# Patient Record
Sex: FEMALE | Race: BLACK OR AFRICAN AMERICAN | NOT HISPANIC OR LATINO | URBAN - METROPOLITAN AREA
[De-identification: names, ages, dates, MRNs, and addresses within clinical notes are randomized per-mention and may not be internally consistent; named-entity substitution may affect disease eponyms.]

---

## 2017-07-23 ENCOUNTER — INPATIENT (INPATIENT)
Facility: HOSPITAL | Age: 42
LOS: 2 days | Discharge: HOME | End: 2017-07-26
Attending: INTERNAL MEDICINE | Admitting: INTERNAL MEDICINE
Payer: MEDICAID

## 2017-07-23 VITALS
OXYGEN SATURATION: 100 % | HEART RATE: 106 BPM | DIASTOLIC BLOOD PRESSURE: 95 MMHG | SYSTOLIC BLOOD PRESSURE: 172 MMHG | RESPIRATION RATE: 30 BRPM

## 2017-07-23 DIAGNOSIS — Z29.9 ENCOUNTER FOR PROPHYLACTIC MEASURES, UNSPECIFIED: ICD-10-CM

## 2017-07-23 DIAGNOSIS — I20.0 UNSTABLE ANGINA: ICD-10-CM

## 2017-07-23 DIAGNOSIS — I10 ESSENTIAL (PRIMARY) HYPERTENSION: ICD-10-CM

## 2017-07-23 DIAGNOSIS — J81.0 ACUTE PULMONARY EDEMA: ICD-10-CM

## 2017-07-23 DIAGNOSIS — J45.909 UNSPECIFIED ASTHMA, UNCOMPLICATED: ICD-10-CM

## 2017-07-23 DIAGNOSIS — Z98.891 HISTORY OF UTERINE SCAR FROM PREVIOUS SURGERY: Chronic | ICD-10-CM

## 2017-07-23 LAB
ALBUMIN SERPL ELPH-MCNC: 4.1 G/DL — SIGNIFICANT CHANGE UP (ref 3.3–5)
ALP SERPL-CCNC: 89 U/L — SIGNIFICANT CHANGE UP (ref 40–120)
ALT FLD-CCNC: 17 U/L — SIGNIFICANT CHANGE UP (ref 4–33)
APTT BLD: 26.8 SEC — LOW (ref 27.5–37.4)
AST SERPL-CCNC: 23 U/L — SIGNIFICANT CHANGE UP (ref 4–32)
BASE EXCESS BLDV CALC-SCNC: -1 MMOL/L — SIGNIFICANT CHANGE UP
BASOPHILS # BLD AUTO: 0.02 K/UL — SIGNIFICANT CHANGE UP (ref 0–0.2)
BASOPHILS NFR BLD AUTO: 0.3 % — SIGNIFICANT CHANGE UP (ref 0–2)
BILIRUB SERPL-MCNC: 0.2 MG/DL — SIGNIFICANT CHANGE UP (ref 0.2–1.2)
BLD GP AB SCN SERPL QL: NEGATIVE — SIGNIFICANT CHANGE UP
BLOOD GAS VENOUS - CREATININE: 0.91 MG/DL — SIGNIFICANT CHANGE UP (ref 0.5–1.3)
BUN SERPL-MCNC: 15 MG/DL — SIGNIFICANT CHANGE UP (ref 7–23)
CALCIUM SERPL-MCNC: 8.8 MG/DL — SIGNIFICANT CHANGE UP (ref 8.4–10.5)
CHLORIDE BLDV-SCNC: 113 MMOL/L — HIGH (ref 96–108)
CHLORIDE SERPL-SCNC: 103 MMOL/L — SIGNIFICANT CHANGE UP (ref 98–107)
CK MB BLD-MCNC: 1.86 NG/ML — SIGNIFICANT CHANGE UP (ref 1–4.7)
CK MB BLD-MCNC: 2.06 NG/ML — SIGNIFICANT CHANGE UP (ref 1–4.7)
CK MB BLD-MCNC: SIGNIFICANT CHANGE UP (ref 0–2.5)
CK SERPL-CCNC: 119 U/L — SIGNIFICANT CHANGE UP (ref 25–170)
CK SERPL-CCNC: 129 U/L — SIGNIFICANT CHANGE UP (ref 25–170)
CO2 SERPL-SCNC: 20 MMOL/L — LOW (ref 22–31)
CREAT SERPL-MCNC: 0.96 MG/DL — SIGNIFICANT CHANGE UP (ref 0.5–1.3)
D DIMER BLD IA.RAPID-MCNC: 258 NG/ML — SIGNIFICANT CHANGE UP
EOSINOPHIL # BLD AUTO: 0.11 K/UL — SIGNIFICANT CHANGE UP (ref 0–0.5)
EOSINOPHIL NFR BLD AUTO: 1.7 % — SIGNIFICANT CHANGE UP (ref 0–6)
GAS PNL BLDV: 133 MMOL/L — LOW (ref 136–146)
GLUCOSE BLDV-MCNC: 123 — HIGH (ref 70–99)
GLUCOSE SERPL-MCNC: 119 MG/DL — HIGH (ref 70–99)
HCG SERPL-ACNC: < 5 MIU/ML — SIGNIFICANT CHANGE UP
HCO3 BLDV-SCNC: 23 MMOL/L — SIGNIFICANT CHANGE UP (ref 20–27)
HCT VFR BLD CALC: 41.2 % — SIGNIFICANT CHANGE UP (ref 34.5–45)
HCT VFR BLDV CALC: 40.5 % — SIGNIFICANT CHANGE UP (ref 34.5–45)
HGB BLD-MCNC: 13 G/DL — SIGNIFICANT CHANGE UP (ref 11.5–15.5)
HGB BLDV-MCNC: 13.2 G/DL — SIGNIFICANT CHANGE UP (ref 11.5–15.5)
IMM GRANULOCYTES # BLD AUTO: 0.01 # — SIGNIFICANT CHANGE UP
IMM GRANULOCYTES NFR BLD AUTO: 0.2 % — SIGNIFICANT CHANGE UP (ref 0–1.5)
INR BLD: 0.95 — SIGNIFICANT CHANGE UP (ref 0.88–1.17)
LACTATE BLDV-MCNC: 2.6 MMOL/L — HIGH (ref 0.5–2)
LYMPHOCYTES # BLD AUTO: 2.61 K/UL — SIGNIFICANT CHANGE UP (ref 1–3.3)
LYMPHOCYTES # BLD AUTO: 39.7 % — SIGNIFICANT CHANGE UP (ref 13–44)
MCHC RBC-ENTMCNC: 26.1 PG — LOW (ref 27–34)
MCHC RBC-ENTMCNC: 31.6 % — LOW (ref 32–36)
MCV RBC AUTO: 82.6 FL — SIGNIFICANT CHANGE UP (ref 80–100)
MONOCYTES # BLD AUTO: 0.78 K/UL — SIGNIFICANT CHANGE UP (ref 0–0.9)
MONOCYTES NFR BLD AUTO: 11.9 % — SIGNIFICANT CHANGE UP (ref 2–14)
NEUTROPHILS # BLD AUTO: 3.04 K/UL — SIGNIFICANT CHANGE UP (ref 1.8–7.4)
NEUTROPHILS NFR BLD AUTO: 46.2 % — SIGNIFICANT CHANGE UP (ref 43–77)
NRBC # FLD: 0 — SIGNIFICANT CHANGE UP
NT-PROBNP SERPL-SCNC: 1537 PG/ML — SIGNIFICANT CHANGE UP
PCO2 BLDV: 40 MMHG — LOW (ref 41–51)
PH BLDV: 7.38 PH — SIGNIFICANT CHANGE UP (ref 7.32–7.43)
PLATELET # BLD AUTO: 185 K/UL — SIGNIFICANT CHANGE UP (ref 150–400)
PMV BLD: 13.7 FL — HIGH (ref 7–13)
PO2 BLDV: 65 MMHG — HIGH (ref 35–40)
POTASSIUM BLDV-SCNC: 3.4 MMOL/L — SIGNIFICANT CHANGE UP (ref 3.4–4.5)
POTASSIUM SERPL-MCNC: 3.7 MMOL/L — SIGNIFICANT CHANGE UP (ref 3.5–5.3)
POTASSIUM SERPL-SCNC: 3.7 MMOL/L — SIGNIFICANT CHANGE UP (ref 3.5–5.3)
PROT SERPL-MCNC: 7.8 G/DL — SIGNIFICANT CHANGE UP (ref 6–8.3)
PROTHROM AB SERPL-ACNC: 10.6 SEC — SIGNIFICANT CHANGE UP (ref 9.8–13.1)
RBC # BLD: 4.99 M/UL — SIGNIFICANT CHANGE UP (ref 3.8–5.2)
RBC # FLD: 14 % — SIGNIFICANT CHANGE UP (ref 10.3–14.5)
RH IG SCN BLD-IMP: POSITIVE — SIGNIFICANT CHANGE UP
SAO2 % BLDV: 91.2 % — HIGH (ref 60–85)
SODIUM SERPL-SCNC: 141 MMOL/L — SIGNIFICANT CHANGE UP (ref 135–145)
TROPONIN T SERPL-MCNC: < 0.06 NG/ML — SIGNIFICANT CHANGE UP (ref 0–0.06)
TROPONIN T SERPL-MCNC: < 0.06 NG/ML — SIGNIFICANT CHANGE UP (ref 0–0.06)
WBC # BLD: 6.57 K/UL — SIGNIFICANT CHANGE UP (ref 3.8–10.5)
WBC # FLD AUTO: 6.57 K/UL — SIGNIFICANT CHANGE UP (ref 3.8–10.5)

## 2017-07-23 PROCEDURE — 71275 CT ANGIOGRAPHY CHEST: CPT | Mod: 26

## 2017-07-23 PROCEDURE — 71010: CPT | Mod: 26

## 2017-07-23 RX ORDER — LABETALOL HCL 100 MG
100 TABLET ORAL ONCE
Qty: 0 | Refills: 0 | Status: COMPLETED | OUTPATIENT
Start: 2017-07-23 | End: 2017-07-23

## 2017-07-23 RX ORDER — NITROGLYCERIN 6.5 MG
200 CAPSULE, EXTENDED RELEASE ORAL
Qty: 50 | Refills: 0 | Status: DISCONTINUED | OUTPATIENT
Start: 2017-07-23 | End: 2017-07-23

## 2017-07-23 RX ORDER — NITROGLYCERIN 6.5 MG
100 CAPSULE, EXTENDED RELEASE ORAL
Qty: 50 | Refills: 0 | Status: DISCONTINUED | OUTPATIENT
Start: 2017-07-23 | End: 2017-07-23

## 2017-07-23 RX ORDER — ASPIRIN/CALCIUM CARB/MAGNESIUM 324 MG
81 TABLET ORAL DAILY
Qty: 0 | Refills: 0 | Status: DISCONTINUED | OUTPATIENT
Start: 2017-07-23 | End: 2017-07-26

## 2017-07-23 RX ORDER — LISINOPRIL 2.5 MG/1
10 TABLET ORAL DAILY
Qty: 0 | Refills: 0 | Status: DISCONTINUED | OUTPATIENT
Start: 2017-07-23 | End: 2017-07-26

## 2017-07-23 RX ORDER — FUROSEMIDE 40 MG
20 TABLET ORAL ONCE
Qty: 0 | Refills: 0 | Status: COMPLETED | OUTPATIENT
Start: 2017-07-23 | End: 2017-07-23

## 2017-07-23 RX ORDER — ALBUTEROL 90 UG/1
2 AEROSOL, METERED ORAL EVERY 6 HOURS
Qty: 0 | Refills: 0 | Status: DISCONTINUED | OUTPATIENT
Start: 2017-07-23 | End: 2017-07-26

## 2017-07-23 RX ORDER — FUROSEMIDE 40 MG
20 TABLET ORAL DAILY
Qty: 0 | Refills: 0 | Status: DISCONTINUED | OUTPATIENT
Start: 2017-07-24 | End: 2017-07-26

## 2017-07-23 RX ORDER — IPRATROPIUM/ALBUTEROL SULFATE 18-103MCG
3 AEROSOL WITH ADAPTER (GRAM) INHALATION ONCE
Qty: 0 | Refills: 0 | Status: COMPLETED | OUTPATIENT
Start: 2017-07-23 | End: 2017-07-23

## 2017-07-23 RX ORDER — NITROGLYCERIN 6.5 MG
50 CAPSULE, EXTENDED RELEASE ORAL
Qty: 50 | Refills: 0 | Status: DISCONTINUED | OUTPATIENT
Start: 2017-07-23 | End: 2017-07-23

## 2017-07-23 RX ADMIN — Medication 30 MICROGRAM(S)/MIN: at 06:55

## 2017-07-23 RX ADMIN — Medication 100 MILLIGRAM(S): at 07:59

## 2017-07-23 RX ADMIN — Medication 81 MILLIGRAM(S): at 14:11

## 2017-07-23 RX ADMIN — Medication 30 MICROGRAM(S)/MIN: at 09:22

## 2017-07-23 RX ADMIN — Medication 20 MILLIGRAM(S): at 09:38

## 2017-07-23 RX ADMIN — LISINOPRIL 10 MILLIGRAM(S): 2.5 TABLET ORAL at 09:35

## 2017-07-23 RX ADMIN — Medication 3 MILLILITER(S): at 06:50

## 2017-07-23 RX ADMIN — Medication 15 MICROGRAM(S)/MIN: at 09:33

## 2017-07-23 NOTE — H&P ADULT - HISTORY OF PRESENT ILLNESS
40 yo female PMHx of Asthma and HTN not on any meds p/w chest tightness and SOB at rest since 4 am. Pt reports she was up all night with talking and laughing with her family, when she suddenly became dyspneic at rest and developed severe chest tightness. Chest tightness was midsternal, 10/10, non-pleuritic, non-radiating, at rest. Pt reports she felt like she was wheezing and inhaled few puffs of Albuterol inhaler with minimal relief. She called 911 who gave her Nitro SL tabs x3 doses and her symptoms resolved. Pt denies fever, chills, diaphoresis, palpitations, nausea, vomiting or abdominal. Pt never had TTE or ischemic work up before.

## 2017-07-23 NOTE — H&P ADULT - NSHPLABSRESULTS_GEN_ALL_CORE
EKG: Sinus Tachycardia @ 113 bpm with TWI III and AVF, LVH.  Rowan x1: neg  CXR: Clear lungs.  CTPA: No pulmonary embolism. Cardiomegaly.  proBNP: 1537  HCG: neg

## 2017-07-23 NOTE — ED PROVIDER NOTE - PROGRESS NOTE DETAILS
CCU NP: labetolol 100 mg PO. Will see pt in ED[facility]ariana [facility]STA. Cardiology fellow: Has seen pt at bedside. Trialing off Bipap. Recommends adding Lisinopril and Lasix. KARLA. BP normalizing. Nitro ggt stopped. Comfortable respirations off BiPAP.

## 2017-07-23 NOTE — CONSULT NOTE ADULT - ASSESSMENT
Attempted to call mom without answer. Left message for mom to call back.    41 year old woman with history of asthma and hypertension presents with shortness of breath found hypertension urgency with EKG suggestive LVH

## 2017-07-23 NOTE — H&P ADULT - ATTENDING COMMENTS
41 year old woman with HTN non-adherent to medications and asthma presents with acute dyspnea and hypertensive emergency    #Hypertensive emergency- BP better controlled now. Continue Lasix 20mg IV daily. Check TTE- suspicion for dilated cardiomyopathy.    #Chest pain- EKG is sinus rhythm with ST depressions in the inferior and lateral leads, however currently no chest pain. Trend cardiac enzymes. Ischemic eval pending TTE results.     #Asthma- stable; albuterol prn

## 2017-07-23 NOTE — ED PROVIDER NOTE - CARE PLAN
Principal Discharge DX:	SOB (shortness of breath) Principal Discharge DX:	Flash pulmonary edema  Secondary Diagnosis:	Essential hypertension

## 2017-07-23 NOTE — ED PROCEDURE NOTE - PROCEDURE ADDITIONAL DETAILS
Focused ED TTE  Grey scale imaging obtained in four views ( parasternal long, parasternal short, apical and subxiphoid)  no pericardial effusion noted.  no gross wall motion abnormalities, moderate-severe decreased contractility  Impression: no pericardial effusion, moderate-severe decreased EF  [place] [id number]

## 2017-07-23 NOTE — ED PROVIDER NOTE - CRITICAL CARE PROVIDED
documentation/direct patient care (not related to procedure)/consult w/ pt's family directly relating to pts condition/interpretation of diagnostic studies/consultation with other physicians

## 2017-07-23 NOTE — H&P ADULT - RS GEN PE MLT RESP DETAILS PC
no rales/breath sounds equal/no rhonchi/no wheezes/no chest wall tenderness/good air movement/clear to auscultation bilaterally/no intercostal retractions/respirations non-labored/airway patent

## 2017-07-23 NOTE — H&P ADULT - ASSESSMENT
40 yo female PMHx of Asthma and HTN not on any meds p/w chest tightness and acute dyspnea at rest since 4am alleviated by 3 nitro tabs given by EMS admitted to tele for unstable angina.

## 2017-07-23 NOTE — ED PROVIDER NOTE - OBJECTIVE STATEMENT
41F hx of HTN (non compliant with her meds) and asthma BIBE for dyspnea. Pt had difficulty with breathing a few days ago but went away. Starting from last night, sob has become worsen. No chest pain or pressure, headache or dizziness. No cough, n/v/f/c. Pt was put on BiPAP and 6 Nitro tabs on EMS. No hx of cancer, no recent surgery, no calf tenderness, no use of hormone.

## 2017-07-23 NOTE — ED PROVIDER NOTE - ATTENDING CONTRIBUTION TO CARE
I was physically present for the E/M service provided. I agree with above history, physical, and plan which I have reviewed and edited where appropriate. I was physically present for the key portions of the service provided.    HTN w/ b/l pulmonary edema. Likely flash pulmonary edema 2/2 uncontrolled HTN. BiPAP + Nitro ggt. CT Chest r/o PE.

## 2017-07-23 NOTE — ED PROCEDURE NOTE - PROCEDURE ADDITIONAL DETAILS
ED Focused Bedside Ultrasound of Lung   Right Lung: [yes base] B-lines, [Y] Lung sliding, [small] Pleural effusion   Left Lung: [yes bases] B-lines, [Y] Lung sliding, [no] Pleural effusion   Impression: [yes bilateral bases] Pulmonary edema, [N] Pneumothorax, [small right] Pleural effusion.   Y=Yes, N=No. CPT Code 56381. FALKOWSKA.

## 2017-07-23 NOTE — H&P ADULT - PROBLEM SELECTOR PLAN 1
tele monitor   cardiac enzymes x 2  Serial EKGs  DASH diet  started on ecotrin 81mg po daily  continue lisinopril 10mg daily started by ED  TTE

## 2017-07-23 NOTE — ED ADULT NURSE NOTE - OBJECTIVE STATEMENT
Pt rec'd by ems awake and alert on Bipap, not speaking because of dyspnea but able to understand questions and nod in response to questions. Pt daughter states mom woke her up this am with dyspnea, pt took nebulizer at home with no relief. On ems arrival pt placed on bipap with some improvement, pt given 6 nitro SL in total before arrival to ED.  Pt has hx of asthma and HTN. HTN of exam. MD at bedside, cardiac monitor in place.

## 2017-07-23 NOTE — CONSULT NOTE ADULT - PROBLEM SELECTOR RECOMMENDATION 9
-breathing improved, off bipap  -lasix IV 20mg x1  -start lisinopril 10, wean off nitro gtt to off  -cont labetalol  -titrate anti-htn meds to goal <140/90; 25% decrease daily from high on presentation 220s  -check TTE  -monitor on tele

## 2017-07-23 NOTE — ED PROVIDER NOTE - SHIFT CHANGE DETAILS
[] CT chest r/o PE  [] Trial off BiPAP and Nitro ggt  [] Admit to tele for flash pulmonary edema 2/2 hypertension if above achieved  KARLA

## 2017-07-23 NOTE — CONSULT NOTE ADULT - SUBJECTIVE AND OBJECTIVE BOX
Patient seen and evaluated @ [facility] ER  Chief Complaint: SOB    HPI:  41 year old woman with history of asthma and hypertension presents with shortness of breath.    Patient explains that starting this morning suddenly felt short of breath when laying down associated with non-productive cough. She thought it was her asthma acting up so she tried her home nebulizer, drinking water, and massaging her back, but without relief. She denies CP, back pain, LH/dizziness, naussea/vomitting, headache, or LOC. 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 Poor medical follow up, used to be on metoprolol for HTN.    PMH:   No pertinent past medical history    PSH:   No significant past surgical history    Medications:   nitroglycerin  Infusion 200 MICROgram(s)/Min IV Continuous <Continuous>  nitroglycerin  Infusion 100 MICROgram(s)/Min IV Continuous <Continuous>    Allergies:  No Known Allergies    FAMILY HISTORY:    Social History:  Smoking: denies  Alcohol: denies  Drugs: denies    Review of Systems: see HPI  Constitutional: [ ] Fever [ ] Chills [ ] Fatigue [ ] Weight change   HEENT: [ ] Blurred vision [ ] Eye Pain [ ] Headache [ ] Runny nose [ ] Sore Throat   Respiratory: [ ] Cough [ ] Wheezing [ ] Shortness of breath  Cardiovascular: [ ] Chest Pain [ ] Palpitations [ ] ADLER [ ] PND [ ] Orthopnea  Gastrointestinal: [ ] Abdominal Pain [ ] Diarrhea [ ] Constipation [ ] Hemorrhoids [ ] Nausea [ ] Vomiting  Genitourinary: [ ] Nocturia [ ] Dysuria [ ] Incontinence  Extremities: [ ] Swelling [ ] Joint Pain  Neurologic: [ ] Focal deficit [ ] Paresthesias [ ] Syncope  Lymphatic: [ ] Swelling [ ] Lymphadenopathy   Skin: [ ] Rash [ ] Ecchymoses [ ] Wounds [ ] Lesions  Psychiatry: [ ] Depression [ ] Suicidal/Homicidal Ideation [ ] Anxiety [ ] Sleep Disturbances  [ ] 10 point review of systems is otherwise negative except as mentioned above            [ ]Unable to obtain    Physical Exam:  T(C): 37.4 (07-23-17 @ 06:58), Max: 37.4 (07-23-17 @ 06:58)  HR: 84 (07-23-17 @ 08:01) (84 - 117)  BP: 147/89 (07-23-17 @ 08:01) (145/88 - 179/97)  RR: 15 (07-23-17 @ 08:01) (15 - 30)  SpO2: 100% (07-23-17 @ 08:01) (99% - 100%)  Wt(kg): --    Daily     Daily     Appearance: [x ] Normal [x ] NAD; breathing comfortable off bipap  Eyes: [x ] PERRL [x ] EOMI  HENT: [ x] Normal oral mus[place]a [x ]NC/AT  Cardiovascular: [x ] S1 [ x] S2 [x ] RRR x[ ] No m/r/g [x ]No edema [x ] no JVP  Respiratory: [x ] Clear to auscultation bilaterally  Gastrointestinal: [x] Soft [ x] Non-tender [ x] Non-distended [x ] BS+  Musculoskeletal: [ x] No clubbing [ x] No joint deformity   Neurologic: [x ] Non-focal  Lymphatic: [ x] No lymphadenopathy  Psychiatry: [x ] AAOx3 x[ ] Mood & affect appropriate  Skin: [ x] No rashes x[ ] No ecchymoses [x No cyanosis    Cardiovascular Diagnostic Testing:  ECG: sinus 118, normal axis, normal intervals, LAE, LVH w st changes suggestive hypertrophy    Interpretation of Telemetry: sinus 80-120s    Imaging:  < from: Xray Chest 1 View AP- PORTABLE-Urgent (07.23.17 @ 06:17) >  There is no pneumothorax, no pleural effusions.   Cardiac size is not accurately evaluated in this projection.  The visualized osseous structures demonstrate no acute abnormality.    IMPRESSION: Clear lungs.    < end of copied text >      Labs:                        13.0   6.57  )-----------( 185      ( 23 Jul 2017 06:12 )             41.2     07-23    141  |  103  |  15  ----------------------------<  119<H>  3.7   |  20<L>  |  0.96    Ca    8.8      23 Jul 2017 06:38    TPro  7.8  /  Alb  4.1  /  TBili  0.2  /  DBili  x   /  AST  23  /  ALT  17  /  AlkPhos  89  07-23    PT/INR - ( 23 Jul 2017 06:38 )   PT: 10.6 SEC;   INR: 0.95          PTT - ( 23 Jul 2017 06:38 )  PTT:26.8 SEC  CARDIAC MARKERS ( 23 Jul 2017 06:38 )  x     / < 0.06 ng/mL / 129 u/L / 1.86 ng/mL / x          Serum Pro-Brain Natriuretic Peptide: 1537 pg/mL (07-23 @ 06:38)

## 2017-07-24 LAB
BUN SERPL-MCNC: 13 MG/DL — SIGNIFICANT CHANGE UP (ref 7–23)
CALCIUM SERPL-MCNC: 9 MG/DL — SIGNIFICANT CHANGE UP (ref 8.4–10.5)
CHLORIDE SERPL-SCNC: 101 MMOL/L — SIGNIFICANT CHANGE UP (ref 98–107)
CHOLEST SERPL-MCNC: 157 MG/DL — SIGNIFICANT CHANGE UP (ref 120–199)
CO2 SERPL-SCNC: 25 MMOL/L — SIGNIFICANT CHANGE UP (ref 22–31)
CREAT SERPL-MCNC: 0.91 MG/DL — SIGNIFICANT CHANGE UP (ref 0.5–1.3)
GLUCOSE SERPL-MCNC: 96 MG/DL — SIGNIFICANT CHANGE UP (ref 70–99)
HCT VFR BLD CALC: 41.4 % — SIGNIFICANT CHANGE UP (ref 34.5–45)
HDLC SERPL-MCNC: 42 MG/DL — LOW (ref 45–65)
HGB BLD-MCNC: 13 G/DL — SIGNIFICANT CHANGE UP (ref 11.5–15.5)
LIPID PNL WITH DIRECT LDL SERPL: 126 MG/DL — SIGNIFICANT CHANGE UP
MCHC RBC-ENTMCNC: 25.6 PG — LOW (ref 27–34)
MCHC RBC-ENTMCNC: 31.4 % — LOW (ref 32–36)
MCV RBC AUTO: 81.7 FL — SIGNIFICANT CHANGE UP (ref 80–100)
NRBC # FLD: 0 — SIGNIFICANT CHANGE UP
PLATELET # BLD AUTO: 175 K/UL — SIGNIFICANT CHANGE UP (ref 150–400)
PMV BLD: 13.3 FL — HIGH (ref 7–13)
POTASSIUM SERPL-MCNC: 4 MMOL/L — SIGNIFICANT CHANGE UP (ref 3.5–5.3)
POTASSIUM SERPL-SCNC: 4 MMOL/L — SIGNIFICANT CHANGE UP (ref 3.5–5.3)
RBC # BLD: 5.07 M/UL — SIGNIFICANT CHANGE UP (ref 3.8–5.2)
RBC # FLD: 14 % — SIGNIFICANT CHANGE UP (ref 10.3–14.5)
SODIUM SERPL-SCNC: 139 MMOL/L — SIGNIFICANT CHANGE UP (ref 135–145)
TRIGL SERPL-MCNC: 116 MG/DL — SIGNIFICANT CHANGE UP (ref 10–149)
WBC # BLD: 6.51 K/UL — SIGNIFICANT CHANGE UP (ref 3.8–10.5)
WBC # FLD AUTO: 6.51 K/UL — SIGNIFICANT CHANGE UP (ref 3.8–10.5)

## 2017-07-24 PROCEDURE — 93306 TTE W/DOPPLER COMPLETE: CPT | Mod: 26

## 2017-07-24 RX ORDER — CARVEDILOL PHOSPHATE 80 MG/1
6.25 CAPSULE, EXTENDED RELEASE ORAL EVERY 12 HOURS
Qty: 0 | Refills: 0 | Status: DISCONTINUED | OUTPATIENT
Start: 2017-07-24 | End: 2017-07-26

## 2017-07-24 RX ADMIN — Medication 81 MILLIGRAM(S): at 12:44

## 2017-07-24 RX ADMIN — LISINOPRIL 10 MILLIGRAM(S): 2.5 TABLET ORAL at 05:39

## 2017-07-24 RX ADMIN — Medication 20 MILLIGRAM(S): at 05:39

## 2017-07-24 RX ADMIN — CARVEDILOL PHOSPHATE 6.25 MILLIGRAM(S): 80 CAPSULE, EXTENDED RELEASE ORAL at 19:48

## 2017-07-24 NOTE — PROGRESS NOTE ADULT - SUBJECTIVE AND OBJECTIVE BOX
Cardiovascular Disease Progress Note    Overnight events: No acute events overnight. Breathing is improved. No SOB  Otherwise review of systems negative    Objective Findings:  T(C): 36.6 (17 @ 04:59), Max: 37.1 (17 @ 17:45)  HR: 74 (17 @ 04:59) (64 - 74)  BP: 139/86 (17 @ 04:59) (139/86 - 160/89)  RR: 12 (17 @ 04:59) (12 - 18)  SpO2: 100% (17 @ 04:59) (99% - 100%)  Wt(kg): --  Daily Height in cm: 175.26 (2017 13:42)    Daily Weight in k (2017 07:56)      Physical Exam:  Gen: NAD  HEENT: EOMI  CV: RRR, normal S1 + S2, no m/r/g  Lungs: CTAB  Abd: soft, non-tender  Ext: No edema    Telemetry: Sinus; no ectopy    Laboratory Data:                        13.0   6.51  )-----------( 175      ( 2017 06:00 )             41.4     07-    139  |  101  |  13  ----------------------------<  96  4.0   |  25  |  0.91    Ca    9.0      2017 06:00    TPro  7.8  /  Alb  4.1  /  TBili  0.2  /  DBili  x   /  AST  23  /  ALT  17  /  AlkPhos  89  07-23    PT/INR - ( 2017 06:38 )   PT: 10.6 SEC;   INR: 0.95          PTT - ( 2017 06:38 )  PTT:26.8 SEC  CARDIAC MARKERS ( 2017 14:10 )  x     / < 0.06 ng/mL / 119 u/L / 2.06 ng/mL / x      CARDIAC MARKERS ( 2017 06:38 )  x     / < 0.06 ng/mL / 129 u/L / 1.86 ng/mL / x              Inpatient Medications:  MEDICATIONS  (STANDING):  lisinopril 10 milliGRAM(s) Oral daily  aspirin enteric coated 81 milliGRAM(s) Oral daily  furosemide   Injectable 20 milliGRAM(s) IV Push daily      Assessment: 41 year old woman with HTN non-adherent to medications and asthma presents with acute dyspnea and hypertensive emergency      Plan of Care:    #Hypertensive emergency- BP better controlled now, but still elevated. Tolerating lisinopril. Will start Coreg 6.25mg BID. Continue Lasix 20mg IV daily. Check TTE- suspicion for dilated cardiomyopathy.    #Chest pain- EKG is sinus rhythm with ST depressions in the inferior and lateral leads, however currently no chest pain. Trend cardiac enzymes. Ischemic eval pending TTE results.     #Asthma- stable; albuterol prn.     Over 35 minutes spent on total encounter; more than 50% of the visit was spent counseling and/or coordinating care by the attending physician.      Reyes Rehman M.D.   Cardiovascular Disease  [phone]

## 2017-07-25 LAB
BUN SERPL-MCNC: 16 MG/DL — SIGNIFICANT CHANGE UP (ref 7–23)
CALCIUM SERPL-MCNC: 9.3 MG/DL — SIGNIFICANT CHANGE UP (ref 8.4–10.5)
CHLORIDE SERPL-SCNC: 103 MMOL/L — SIGNIFICANT CHANGE UP (ref 98–107)
CO2 SERPL-SCNC: 23 MMOL/L — SIGNIFICANT CHANGE UP (ref 22–31)
CREAT SERPL-MCNC: 0.86 MG/DL — SIGNIFICANT CHANGE UP (ref 0.5–1.3)
GLUCOSE SERPL-MCNC: 81 MG/DL — SIGNIFICANT CHANGE UP (ref 70–99)
HCG UR-SCNC: NEGATIVE — SIGNIFICANT CHANGE UP
HCT VFR BLD CALC: 40.9 % — SIGNIFICANT CHANGE UP (ref 34.5–45)
HGB BLD-MCNC: 13.2 G/DL — SIGNIFICANT CHANGE UP (ref 11.5–15.5)
MAGNESIUM SERPL-MCNC: 2 MG/DL — SIGNIFICANT CHANGE UP (ref 1.6–2.6)
MCHC RBC-ENTMCNC: 26.7 PG — LOW (ref 27–34)
MCHC RBC-ENTMCNC: 32.3 % — SIGNIFICANT CHANGE UP (ref 32–36)
MCV RBC AUTO: 82.8 FL — SIGNIFICANT CHANGE UP (ref 80–100)
NRBC # FLD: 0 — SIGNIFICANT CHANGE UP
PLATELET # BLD AUTO: 164 K/UL — SIGNIFICANT CHANGE UP (ref 150–400)
PMV BLD: 13.4 FL — HIGH (ref 7–13)
POTASSIUM SERPL-MCNC: 4.1 MMOL/L — SIGNIFICANT CHANGE UP (ref 3.5–5.3)
POTASSIUM SERPL-SCNC: 4.1 MMOL/L — SIGNIFICANT CHANGE UP (ref 3.5–5.3)
RBC # BLD: 4.94 M/UL — SIGNIFICANT CHANGE UP (ref 3.8–5.2)
RBC # FLD: 13.7 % — SIGNIFICANT CHANGE UP (ref 10.3–14.5)
SODIUM SERPL-SCNC: 141 MMOL/L — SIGNIFICANT CHANGE UP (ref 135–145)
SP GR UR: 1.01 — SIGNIFICANT CHANGE UP (ref 1–1.03)
WBC # BLD: 7.87 K/UL — SIGNIFICANT CHANGE UP (ref 3.8–10.5)
WBC # FLD AUTO: 7.87 K/UL — SIGNIFICANT CHANGE UP (ref 3.8–10.5)

## 2017-07-25 PROCEDURE — 93018 CV STRESS TEST I&R ONLY: CPT | Mod: GC

## 2017-07-25 PROCEDURE — 78452 HT MUSCLE IMAGE SPECT MULT: CPT | Mod: 26

## 2017-07-25 PROCEDURE — 93016 CV STRESS TEST SUPVJ ONLY: CPT | Mod: GC

## 2017-07-25 RX ADMIN — Medication 81 MILLIGRAM(S): at 11:20

## 2017-07-25 RX ADMIN — LISINOPRIL 10 MILLIGRAM(S): 2.5 TABLET ORAL at 05:40

## 2017-07-25 RX ADMIN — CARVEDILOL PHOSPHATE 6.25 MILLIGRAM(S): 80 CAPSULE, EXTENDED RELEASE ORAL at 17:18

## 2017-07-25 RX ADMIN — CARVEDILOL PHOSPHATE 6.25 MILLIGRAM(S): 80 CAPSULE, EXTENDED RELEASE ORAL at 05:40

## 2017-07-25 RX ADMIN — Medication 20 MILLIGRAM(S): at 05:40

## 2017-07-25 NOTE — PROGRESS NOTE ADULT - SUBJECTIVE AND OBJECTIVE BOX
Cardiovascular Disease Progress Note    Overnight events: No acute events overnight. Ms. Orellana feels well. She denies chest pain or SOB.    Otherwise review of systems negative    Objective Findings:  T(C): 36.7 (17 @ 19:30), Max: 36.7 (17 @ 15:19)  HR: 80 (17 @ 05:40) (70 - 80)  BP: 143/80 (17 @ 05:40) (133/67 - 155/95)  RR: 18 (17 @ 05:40) (18 - 18)  SpO2: 98% (17 @ 05:40) (98% - 100%)  Wt(kg): --  Daily     Daily Weight in k.5 (2017 07:47)      Physical Exam:   Gen: NAD  HEENT: EOMI  CV: RRR, normal S1 + S2, no m/r/g  Lungs: CTAB  Abd: soft, non-tender  Ext: No edema    Telemetry: Sinus 80s; no ectopy    Laboratory Data:                        13.2   7.87  )-----------( 164      ( 2017 05:25 )             40.9     07-25    141  |  103  |  16  ----------------------------<  81  4.1   |  23  |  0.86    Ca    9.3      2017 05:25  Mg     2.0     07-25        CARDIAC MARKERS ( 2017 14:10 )  x     / < 0.06 ng/mL / 119 u/L / 2.06 ng/mL / x              Inpatient Medications:  MEDICATIONS  (STANDING):  lisinopril 10 milliGRAM(s) Oral daily  aspirin enteric coated 81 milliGRAM(s) Oral daily  furosemide   Injectable 20 milliGRAM(s) IV Push daily  carvedilol 6.25 milliGRAM(s) Oral every 12 hours      Assessment: 41 year old woman with HTN non-adherent to medications and asthma presents with acute dyspnea and hypertensive emergency      Plan of Care:    #Compensated systolic CHF- TTE confirming dilated severe cardiomyopathy. Will need to distinguish ischemic vs. non-ischemic. Plan for exercise nuclear stress test. Continue BB and ACEi.     #Hypertensive emergency- BP better controlled now. Tolerating lisinopril and Coreg 6.25mg BID. Continue Lasix 20mg IV daily.     #Chest pain- EKG is sinus rhythm with ST depressions in the inferior and lateral leads, however currently no chest pain. Ischemic eval with NST.    #Asthma- stable; albuterol prn.       Over 35 minutes spent on total encounter; more than 50% of the visit was spent counseling and/or coordinating care by the attending physician.      Reyes Rehman M.D.   Cardiovascular Disease  [phone] Cardiovascular Disease Progress Note    Overnight events: No acute events overnight. Ms. Orellana feels well. She denies chest pain or SOB.    Otherwise review of systems negative    Objective Findings:  T(C): 36.7 (17 @ 19:30), Max: 36.7 (17 @ 15:19)  HR: 80 (17 @ 05:40) (70 - 80)  BP: 143/80 (17 @ 05:40) (133/67 - 155/95)  RR: 18 (17 @ 05:40) (18 - 18)  SpO2: 98% (17 @ 05:40) (98% - 100%)  Wt(kg): --  Daily     Daily Weight in k.5 (2017 07:47)      Physical Exam:   Gen: NAD  HEENT: EOMI  CV: RRR, normal S1 + S2, no m/r/g  Lungs: CTAB  Abd: soft, non-tender  Ext: No edema    Telemetry: Sinus 80s; no ectopy    Laboratory Data:                        13.2   7.87  )-----------( 164      ( 2017 05:25 )             40.9     07-25    141  |  103  |  16  ----------------------------<  81  4.1   |  23  |  0.86    Ca    9.3      2017 05:25  Mg     2.0     07-25        CARDIAC MARKERS ( 2017 14:10 )  x     / < 0.06 ng/mL / 119 u/L / 2.06 ng/mL / x              Inpatient Medications:  MEDICATIONS  (STANDING):  lisinopril 10 milliGRAM(s) Oral daily  aspirin enteric coated 81 milliGRAM(s) Oral daily  furosemide   Injectable 20 milliGRAM(s) IV Push daily  carvedilol 6.25 milliGRAM(s) Oral every 12 hours      Assessment: 41 year old woman with HTN non-adherent to medications and asthma presents with acute dyspnea and hypertensive emergency      Plan of Care:    #Compensated systolic CHF- TTE confirming dilated severe cardiomyopathy. Will need to distinguish ischemic vs. non-ischemic. Plan for exercise nuclear stress test pending pregnancy test. Continue BB and ACEi.     #Hypertensive emergency- BP better controlled now. Tolerating lisinopril and Coreg 6.25mg BID. Continue Lasix 20mg IV daily.     #Chest pain- EKG is sinus rhythm with ST depressions in the inferior and lateral leads, however currently no chest pain. Ischemic eval with NST pending pregnancy test.    #Asthma- stable; albuterol prn.       Over 35 minutes spent on total encounter; more than 50% of the visit was spent counseling and/or coordinating care by the attending physician.      Reyes Rehman M.D.   Cardiovascular Disease  [phone]

## 2017-07-26 ENCOUNTER — TRANSCRIPTION ENCOUNTER (OUTPATIENT)
Age: 42
End: 2017-07-26

## 2017-07-26 VITALS
TEMPERATURE: 98 F | OXYGEN SATURATION: 100 % | DIASTOLIC BLOOD PRESSURE: 91 MMHG | SYSTOLIC BLOOD PRESSURE: 143 MMHG | RESPIRATION RATE: 19 BRPM | HEART RATE: 82 BPM

## 2017-07-26 LAB
BUN SERPL-MCNC: 15 MG/DL — SIGNIFICANT CHANGE UP (ref 7–23)
CALCIUM SERPL-MCNC: 9.4 MG/DL — SIGNIFICANT CHANGE UP (ref 8.4–10.5)
CHLORIDE SERPL-SCNC: 100 MMOL/L — SIGNIFICANT CHANGE UP (ref 98–107)
CO2 SERPL-SCNC: 24 MMOL/L — SIGNIFICANT CHANGE UP (ref 22–31)
CREAT SERPL-MCNC: 0.96 MG/DL — SIGNIFICANT CHANGE UP (ref 0.5–1.3)
GLUCOSE SERPL-MCNC: 85 MG/DL — SIGNIFICANT CHANGE UP (ref 70–99)
HCT VFR BLD CALC: 40.5 % — SIGNIFICANT CHANGE UP (ref 34.5–45)
HGB BLD-MCNC: 12.9 G/DL — SIGNIFICANT CHANGE UP (ref 11.5–15.5)
MAGNESIUM SERPL-MCNC: 2 MG/DL — SIGNIFICANT CHANGE UP (ref 1.6–2.6)
MCHC RBC-ENTMCNC: 26 PG — LOW (ref 27–34)
MCHC RBC-ENTMCNC: 31.9 % — LOW (ref 32–36)
MCV RBC AUTO: 81.5 FL — SIGNIFICANT CHANGE UP (ref 80–100)
NRBC # FLD: 0 — SIGNIFICANT CHANGE UP
PLATELET # BLD AUTO: 171 K/UL — SIGNIFICANT CHANGE UP (ref 150–400)
PMV BLD: 13.5 FL — HIGH (ref 7–13)
POTASSIUM SERPL-MCNC: 4 MMOL/L — SIGNIFICANT CHANGE UP (ref 3.5–5.3)
POTASSIUM SERPL-SCNC: 4 MMOL/L — SIGNIFICANT CHANGE UP (ref 3.5–5.3)
RBC # BLD: 4.97 M/UL — SIGNIFICANT CHANGE UP (ref 3.8–5.2)
RBC # FLD: 13.7 % — SIGNIFICANT CHANGE UP (ref 10.3–14.5)
SODIUM SERPL-SCNC: 139 MMOL/L — SIGNIFICANT CHANGE UP (ref 135–145)
WBC # BLD: 8.54 K/UL — SIGNIFICANT CHANGE UP (ref 3.8–10.5)
WBC # FLD AUTO: 8.54 K/UL — SIGNIFICANT CHANGE UP (ref 3.8–10.5)

## 2017-07-26 RX ORDER — ASPIRIN/CALCIUM CARB/MAGNESIUM 324 MG
1 TABLET ORAL
Qty: 60 | Refills: 0
Start: 2017-07-26 | End: 2017-09-24

## 2017-07-26 RX ORDER — FUROSEMIDE 40 MG
1 TABLET ORAL
Qty: 30 | Refills: 0
Start: 2017-07-26 | End: 2017-08-25

## 2017-07-26 RX ORDER — CARVEDILOL PHOSPHATE 80 MG/1
1 CAPSULE, EXTENDED RELEASE ORAL
Qty: 60 | Refills: 0 | OUTPATIENT
Start: 2017-07-26 | End: 2017-08-25

## 2017-07-26 RX ORDER — LISINOPRIL 2.5 MG/1
1 TABLET ORAL
Qty: 30 | Refills: 0 | OUTPATIENT
Start: 2017-07-26 | End: 2017-08-25

## 2017-07-26 RX ADMIN — Medication 81 MILLIGRAM(S): at 11:58

## 2017-07-26 RX ADMIN — Medication 20 MILLIGRAM(S): at 05:16

## 2017-07-26 RX ADMIN — LISINOPRIL 10 MILLIGRAM(S): 2.5 TABLET ORAL at 05:16

## 2017-07-26 RX ADMIN — CARVEDILOL PHOSPHATE 6.25 MILLIGRAM(S): 80 CAPSULE, EXTENDED RELEASE ORAL at 05:16

## 2017-07-26 NOTE — DISCHARGE NOTE ADULT - HOSPITAL COURSE
40 yo female PMHx of Asthma and HTN not on any meds p/w chest tightness and SOB at rest since 4 am. Pt reports she was up all night with talking and laughing with her family, when she suddenly became dyspneic at rest and developed severe chest tightness. Chest tightness was midsternal, 10/10, non-pleuritic, non-radiating, at rest. Pt reports she felt like she was wheezing and inhaled few puffs of Albuterol inhaler with minimal relief. She called 911 who gave her Nitro SL tabs x3 doses and her symptoms resolved. Pt denies fever, chills, diaphoresis, palpitations, nausea, vomiting or abdominal. Pt never had TTE or ischemic work up before.    Hospital course:  EKG: Sinus Tachycardia @ 113 bpm with TWI III and AVF, LVH.   Rowan x2: neg  CXR: Clear lungs.  CTPA: No pulmonary embolism. Cardiomegaly.  proBNP: 1537  HCG: neg    Pt with negative CE, chest pain free with EKG noted ST depression in inferior and lateral leads. TTE done noted EF 19,  tethered mitral valve leaflets. Mild mitral regurgitation. Normal trileaflet aortic valve. Mild aortic regurgitation. Severe left ventricular enlargement. Endocardial visualization enhanced with intravenous injection of echo contrast (Definity). Severe global left ventricular systolic dysfunction. No LV thrombus seen. Normal right ventricular size and function. NST**** 42 yo female PMHx of Asthma and HTN not on any meds p/w chest tightness and SOB at rest since 4 am. Pt reports she was up all night with talking and laughing with her family, when she suddenly became dyspneic at rest and developed severe chest tightness. Chest tightness was midsternal, 10/10, non-pleuritic, non-radiating, at rest. Pt reports she felt like she was wheezing and inhaled few puffs of Albuterol inhaler with minimal relief. She called 911 who gave her Nitro SL tabs x3 doses and her symptoms resolved. Pt denies fever, chills, diaphoresis, palpitations, nausea, vomiting or abdominal. Pt never had TTE or ischemic work up before.    Hospital course:  EKG: Sinus Tachycardia @ 113 bpm with TWI III and AVF, LVH.   Rowan x2: neg  CXR: Clear lungs.  CTPA: No pulmonary embolism. Cardiomegaly.  proBNP: 1537  HCG: neg    Pt with negative CE, chest pain free with EKG noted ST depression in inferior and lateral leads. TTE done noted EF 19,  tethered mitral valve leaflets. Mild mitral regurgitation. Normal trileaflet aortic valve. Mild aortic regurgitation. Severe left ventricular enlargement. Endocardial visualization enhanced with intravenous injection of echo contrast (Definity). Severe global left ventricular systolic dysfunction. No LV thrombus seen. Normal right ventricular size and function. NST negative for ischemia. Pt discharge to follow up in cardiology clinic

## 2017-07-26 NOTE — DISCHARGE NOTE ADULT - PLAN OF CARE
prevent future episode please continue medication as prescribed, low salt diet and follow up with cardiology clinic at [phone] in 2-3 weeks Continue medications as currently prescribed. Follow up with your PMD for further management of disease. Dash diet.

## 2017-07-26 NOTE — PROGRESS NOTE ADULT - SUBJECTIVE AND OBJECTIVE BOX
Cardiovascular Disease Progress Note    Overnight events: No acute events overnight. Ms. Orellana feels well. No chest pain or SOB.   Otherwise review of systems negative    Objective Findings:  T(C): 36.7 (17 @ 05:20), Max: 37.1 (17 @ 21:37)  HR: 79 (17 @ 05:20) (77 - 90)  BP: 111/63 (17 @ 05:20) (111/63 - 152/89)  RR: 18 (17 @ 05:20) (18 - 18)  SpO2: 100% (17 @ 05:20) (100% - 100%)  Wt(kg): --  Daily     Daily Weight in k.6 (2017 07:59)      Physical Exam:  Gen: NAD  HEENT: EOMI  CV: RRR, normal S1 + S2, no m/r/g  Lungs: CTAB  Abd: soft, non-tender  Ext: No edema    Telemetry: Sinus 80s; no ectopy    Laboratory Data:                        12.9   8.54  )-----------( 171      ( 2017 06:05 )             40.5     07-    139  |  100  |  15  ----------------------------<  85  4.0   |  24  |  0.96    Ca    9.4      2017 06:05  Mg     2.0     -                Inpatient Medications:  MEDICATIONS  (STANDING):  lisinopril 10 milliGRAM(s) Oral daily  aspirin enteric coated 81 milliGRAM(s) Oral daily  furosemide   Injectable 20 milliGRAM(s) IV Push daily  carvedilol 6.25 milliGRAM(s) Oral every 12 hours      Assessment: 41 year old woman with HTN non-adherent to medications and asthma presents with acute dyspnea and hypertensive emergency      Plan of Care:    #Compensated systolic CHF- TTE confirming dilated severe cardiomyopathy. Will need to distinguish ischemic vs. non-ischemic. Patient going down to exercise nuclear stress test now. F/U results. Continue BB and ACEi.     #Hypertensive emergency- BP better controlled now. Tolerating lisinopril and Coreg 6.25mg BID. Continue Lasix 20mg IV daily.     #Chest pain- EKG is sinus rhythm with ST depressions in the inferior and lateral leads, however currently no chest pain. Ischemic eval with NST pending.    #Asthma- stable; albuterol prn.     Over 35 minutes spent on total encounter; more than 50% of the visit was spent counseling and/or coordinating care by the attending physician.      Reyes Rehman M.D.   Cardiovascular Disease  [phone]

## 2017-07-26 NOTE — DISCHARGE NOTE ADULT - PATIENT PORTAL LINK FT
“You can access the [facility] Patient Portal, offered by [facility], by registering with the following website: [email or web address]”

## 2017-07-26 NOTE — DISCHARGE NOTE ADULT - CARE PLAN
Principal Discharge DX:	Heart failure  Goal:	prevent future episode  Instructions for follow-up, activity and diet:	please continue medication as prescribed, low salt diet and follow up with cardiology clinic at [phone] in 2-3 weeks  Secondary Diagnosis:	Essential hypertension  Instructions for follow-up, activity and diet:	Continue medications as currently prescribed. Follow up with your PMD for further management of disease. Dash diet. Principal Discharge DX:	Heart failure  Goal:	prevent future episode  Instructions for follow-up, activity and diet:	please continue medication as prescribed, low salt diet and follow up with cardiology clinic at [phone] in 2-3 weeks  Secondary Diagnosis:	Essential hypertension  Instructions for follow-up, activity and diet:	Continue medications as currently prescribed. Follow up with your PMD for further management of disease. Dash diet.

## 2017-07-26 NOTE — DISCHARGE NOTE ADULT - MEDICATION SUMMARY - MEDICATIONS TO TAKE
I will START or STAY ON the medications listed below when I get home from the hospital:    aspirin 81 mg oral delayed release tablet  -- 1 tab(s) by mouth once a day  -- Indication: For cad    lisinopril 10 mg oral tablet  -- 1 tab(s) by mouth once a day  -- Indication: For Htn    carvedilol 6.25 mg oral tablet  -- 1 tab(s) by mouth every 12 hours  -- Indication: For Htn    albuterol 90 mcg/inh inhalation aerosol  -- 2 puff(s) inhaled 4 times a day, As Needed  -- Indication: For inhalor    Lasix 40 mg oral tablet  -- 1 tab(s) by mouth once a day  -- Avoid prolonged or excessive exposure to direct and/or artificial sunlight while taking this medication.  It is very important that you take or use this exactly as directed.  Do not skip doses or discontinue unless directed by your doctor.  It may be advisable to drink a full glass orange juice or eat a banana daily while taking this medication.    -- Indication: For chf

## 2017-09-07 PROBLEM — Z00.00 ENCOUNTER FOR PREVENTIVE HEALTH EXAMINATION: Status: ACTIVE | Noted: 2017-09-07

## 2017-09-12 ENCOUNTER — APPOINTMENT (OUTPATIENT)
Dept: CARDIOLOGY | Facility: HOSPITAL | Age: 42
End: 2017-09-12

## 2017-11-13 ENCOUNTER — OUTPATIENT (OUTPATIENT)
Dept: OUTPATIENT SERVICES | Facility: HOSPITAL | Age: 42
LOS: 1 days | End: 2017-11-13
Payer: MEDICAID

## 2017-11-13 VITALS
DIASTOLIC BLOOD PRESSURE: 103 MMHG | SYSTOLIC BLOOD PRESSURE: 173 MMHG | WEIGHT: 184.97 LBS | HEIGHT: 69 IN | OXYGEN SATURATION: 100 % | HEART RATE: 72 BPM | TEMPERATURE: 98 F | RESPIRATION RATE: 16 BRPM

## 2017-11-13 DIAGNOSIS — R93.1 ABNORMAL FINDINGS ON DIAGNOSTIC IMAGING OF HEART AND CORONARY CIRCULATION: ICD-10-CM

## 2017-11-13 DIAGNOSIS — Z98.891 HISTORY OF UTERINE SCAR FROM PREVIOUS SURGERY: Chronic | ICD-10-CM

## 2017-11-13 LAB
ALBUMIN SERPL ELPH-MCNC: 4.3 G/DL — SIGNIFICANT CHANGE UP (ref 3.3–5)
ALP SERPL-CCNC: 80 U/L — SIGNIFICANT CHANGE UP (ref 40–120)
ALT FLD-CCNC: 15 U/L RC — SIGNIFICANT CHANGE UP (ref 10–45)
ANION GAP SERPL CALC-SCNC: 13 MMOL/L — SIGNIFICANT CHANGE UP (ref 5–17)
AST SERPL-CCNC: 24 U/L — SIGNIFICANT CHANGE UP (ref 10–40)
BILIRUB SERPL-MCNC: 0.4 MG/DL — SIGNIFICANT CHANGE UP (ref 0.2–1.2)
BUN SERPL-MCNC: 16 MG/DL — SIGNIFICANT CHANGE UP (ref 7–23)
CALCIUM SERPL-MCNC: 9.1 MG/DL — SIGNIFICANT CHANGE UP (ref 8.4–10.5)
CHLORIDE SERPL-SCNC: 105 MMOL/L — SIGNIFICANT CHANGE UP (ref 96–108)
CO2 SERPL-SCNC: 23 MMOL/L — SIGNIFICANT CHANGE UP (ref 22–31)
CREAT SERPL-MCNC: 0.9 MG/DL — SIGNIFICANT CHANGE UP (ref 0.5–1.3)
GLUCOSE SERPL-MCNC: 103 MG/DL — HIGH (ref 70–99)
HCG SERPL QL: NEGATIVE — SIGNIFICANT CHANGE UP
HCT VFR BLD CALC: 39.2 % — SIGNIFICANT CHANGE UP (ref 34.5–45)
HGB BLD-MCNC: 12.8 G/DL — SIGNIFICANT CHANGE UP (ref 11.5–15.5)
MCHC RBC-ENTMCNC: 28 PG — SIGNIFICANT CHANGE UP (ref 27–34)
MCHC RBC-ENTMCNC: 32.6 GM/DL — SIGNIFICANT CHANGE UP (ref 32–36)
MCV RBC AUTO: 85.9 FL — SIGNIFICANT CHANGE UP (ref 80–100)
PLATELET # BLD AUTO: 185 K/UL — SIGNIFICANT CHANGE UP (ref 150–400)
POTASSIUM SERPL-MCNC: 4.2 MMOL/L — SIGNIFICANT CHANGE UP (ref 3.5–5.3)
POTASSIUM SERPL-SCNC: 4.2 MMOL/L — SIGNIFICANT CHANGE UP (ref 3.5–5.3)
PROT SERPL-MCNC: 7.7 G/DL — SIGNIFICANT CHANGE UP (ref 6–8.3)
RBC # BLD: 4.56 M/UL — SIGNIFICANT CHANGE UP (ref 3.8–5.2)
RBC # FLD: 12.4 % — SIGNIFICANT CHANGE UP (ref 10.3–14.5)
SODIUM SERPL-SCNC: 141 MMOL/L — SIGNIFICANT CHANGE UP (ref 135–145)
WBC # BLD: 5.6 K/UL — SIGNIFICANT CHANGE UP (ref 3.8–10.5)
WBC # FLD AUTO: 5.6 K/UL — SIGNIFICANT CHANGE UP (ref 3.8–10.5)

## 2017-11-13 PROCEDURE — 84703 CHORIONIC GONADOTROPIN ASSAY: CPT

## 2017-11-13 PROCEDURE — 80053 COMPREHEN METABOLIC PANEL: CPT

## 2017-11-13 PROCEDURE — 99152 MOD SED SAME PHYS/QHP 5/>YRS: CPT

## 2017-11-13 PROCEDURE — C1894: CPT

## 2017-11-13 PROCEDURE — C1769: CPT

## 2017-11-13 PROCEDURE — C1887: CPT

## 2017-11-13 PROCEDURE — 85027 COMPLETE CBC AUTOMATED: CPT

## 2017-11-13 PROCEDURE — 93460 R&L HRT ART/VENTRICLE ANGIO: CPT

## 2017-11-13 PROCEDURE — 99153 MOD SED SAME PHYS/QHP EA: CPT

## 2017-11-13 PROCEDURE — 93460 R&L HRT ART/VENTRICLE ANGIO: CPT | Mod: 26

## 2017-11-13 RX ORDER — SODIUM CHLORIDE 9 MG/ML
3 INJECTION INTRAMUSCULAR; INTRAVENOUS; SUBCUTANEOUS EVERY 8 HOURS
Qty: 0 | Refills: 0 | Status: DISCONTINUED | OUTPATIENT
Start: 2017-11-13 | End: 2017-11-28

## 2017-11-13 RX ORDER — RANOLAZINE 500 MG/1
500 TABLET, FILM COATED, EXTENDED RELEASE ORAL ONCE
Qty: 0 | Refills: 0 | Status: DISCONTINUED | OUTPATIENT
Start: 2017-11-13 | End: 2017-11-28

## 2017-11-13 NOTE — H&P CARDIOLOGY - HISTORY OF PRESENT ILLNESS
41 y/o female with PMHx of HTN, Asthma, presented in july 2017 at [facility] with dyspnea & hypertensive crisis, her stress test showed ischemia in multivessel area & echo reveal dilated ventricle with severe LV dysfunction & mod severe MR with mild - Mod AI with Ef~  apex is akinetic, seen & evaluated by cardiologist Dr Amos Baker & now recommends for cardiac cath. 41 y/o female with PMHx of HTN, Asthma, presented in july 2017 at [facility] with dyspnea & hypertensive crisis, her stress test showed ischemia in multivessel area & echo reveal dilated ventricle with severe LV dysfunction & mod severe MR with mild - Mod AI with Ef~  apex is akinetic, seen & evaluated by cardiologist Dr Amos Baker & now recommends for cardiac cath.  pt optimized on anti-anginals 43 y/o female with PMHx of HTN, Asthma, presented in july 2017 at [facility] with dyspnea & hypertensive crisis, her stress test showed ischemia in multivessel area & echo reveal dilated ventricle with severe LV dysfunction & mod severe MR with mild - Mod AI with Ef~  apex is akinetic, seen & evaluated by cardiologist Dr Amos Baker & now recommends for cardiac cath.  pt given ranexa 500mg x 1 dose as antianginal..

## 2017-12-26 NOTE — ED ADULT NURSE NOTE - TEMPERATURE IN FAHRENHEIT (DEGREES F)
December 26, 2017      [facility]  [address] [place] [address]  Phone: [phone]  Fax: [phone]       Patient: Emmy Padilla   YOB: 1948  Date of Visit: 12/26/2017    To Whom It May Concern:    Brittani Padilla  was at [facility] on 12/26/2017. She may return to work/school on 12/28/2017, or once fever free for 54 hours, with no restrictions. If you have any questions or concerns, or if I can be of further assistance, please do not hesitate to contact me.    Sincerely,    Chasity Vaughn LPN     
99.3

## 2018-11-27 ENCOUNTER — TRANSCRIPTION ENCOUNTER (OUTPATIENT)
Age: 43
End: 2018-11-27

## 2020-10-28 PROBLEM — I10 ESSENTIAL (PRIMARY) HYPERTENSION: Chronic | Status: ACTIVE | Noted: 2017-07-23

## 2020-10-28 PROBLEM — J45.909 UNSPECIFIED ASTHMA, UNCOMPLICATED: Chronic | Status: ACTIVE | Noted: 2017-07-23

## 2020-10-29 DIAGNOSIS — Z01.818 ENCOUNTER FOR OTHER PREPROCEDURAL EXAMINATION: ICD-10-CM

## 2020-10-30 ENCOUNTER — APPOINTMENT (OUTPATIENT)
Dept: DISASTER EMERGENCY | Facility: CLINIC | Age: 45
End: 2020-10-30

## 2020-11-01 ENCOUNTER — FORM ENCOUNTER (OUTPATIENT)
Age: 45
End: 2020-11-01

## 2020-11-01 LAB — SARS-COV-2 N GENE NPH QL NAA+PROBE: NOT DETECTED

## 2020-11-02 ENCOUNTER — OUTPATIENT (OUTPATIENT)
Dept: OUTPATIENT SERVICES | Facility: HOSPITAL | Age: 45
LOS: 1 days | End: 2020-11-02
Payer: COMMERCIAL

## 2020-11-02 VITALS
TEMPERATURE: 98 F | RESPIRATION RATE: 14 BRPM | HEART RATE: 80 BPM | DIASTOLIC BLOOD PRESSURE: 96 MMHG | SYSTOLIC BLOOD PRESSURE: 163 MMHG | OXYGEN SATURATION: 99 %

## 2020-11-02 VITALS
SYSTOLIC BLOOD PRESSURE: 132 MMHG | OXYGEN SATURATION: 100 % | DIASTOLIC BLOOD PRESSURE: 78 MMHG | HEART RATE: 68 BPM | RESPIRATION RATE: 16 BRPM

## 2020-11-02 DIAGNOSIS — Z98.891 HISTORY OF UTERINE SCAR FROM PREVIOUS SURGERY: Chronic | ICD-10-CM

## 2020-11-02 DIAGNOSIS — R94.39 ABNORMAL RESULT OF OTHER CARDIOVASCULAR FUNCTION STUDY: ICD-10-CM

## 2020-11-02 LAB
ANION GAP SERPL CALC-SCNC: 10 MMOL/L — SIGNIFICANT CHANGE UP (ref 5–17)
BUN SERPL-MCNC: 12 MG/DL — SIGNIFICANT CHANGE UP (ref 7–23)
CALCIUM SERPL-MCNC: 9.4 MG/DL — SIGNIFICANT CHANGE UP (ref 8.4–10.5)
CHLORIDE SERPL-SCNC: 106 MMOL/L — SIGNIFICANT CHANGE UP (ref 96–108)
CO2 SERPL-SCNC: 25 MMOL/L — SIGNIFICANT CHANGE UP (ref 22–31)
CREAT SERPL-MCNC: 0.84 MG/DL — SIGNIFICANT CHANGE UP (ref 0.5–1.3)
GLUCOSE SERPL-MCNC: 105 MG/DL — HIGH (ref 70–99)
HCG SERPL-ACNC: <2 MIU/ML — SIGNIFICANT CHANGE UP
HCT VFR BLD CALC: 39.4 % — SIGNIFICANT CHANGE UP (ref 34.5–45)
HGB BLD-MCNC: 12.5 G/DL — SIGNIFICANT CHANGE UP (ref 11.5–15.5)
MCHC RBC-ENTMCNC: 26.8 PG — LOW (ref 27–34)
MCHC RBC-ENTMCNC: 31.7 GM/DL — LOW (ref 32–36)
MCV RBC AUTO: 84.4 FL — SIGNIFICANT CHANGE UP (ref 80–100)
NRBC # BLD: 0 /100 WBCS — SIGNIFICANT CHANGE UP (ref 0–0)
PLATELET # BLD AUTO: 209 K/UL — SIGNIFICANT CHANGE UP (ref 150–400)
POTASSIUM SERPL-MCNC: 4.4 MMOL/L — SIGNIFICANT CHANGE UP (ref 3.5–5.3)
POTASSIUM SERPL-SCNC: 4.4 MMOL/L — SIGNIFICANT CHANGE UP (ref 3.5–5.3)
RBC # BLD: 4.67 M/UL — SIGNIFICANT CHANGE UP (ref 3.8–5.2)
RBC # FLD: 13.8 % — SIGNIFICANT CHANGE UP (ref 10.3–14.5)
SODIUM SERPL-SCNC: 141 MMOL/L — SIGNIFICANT CHANGE UP (ref 135–145)
WBC # BLD: 5.86 K/UL — SIGNIFICANT CHANGE UP (ref 3.8–10.5)
WBC # FLD AUTO: 5.86 K/UL — SIGNIFICANT CHANGE UP (ref 3.8–10.5)

## 2020-11-02 PROCEDURE — C1887: CPT

## 2020-11-02 PROCEDURE — 93005 ELECTROCARDIOGRAM TRACING: CPT

## 2020-11-02 PROCEDURE — C1769: CPT

## 2020-11-02 PROCEDURE — 85027 COMPLETE CBC AUTOMATED: CPT

## 2020-11-02 PROCEDURE — 93458 L HRT ARTERY/VENTRICLE ANGIO: CPT

## 2020-11-02 PROCEDURE — 93458 L HRT ARTERY/VENTRICLE ANGIO: CPT | Mod: 26

## 2020-11-02 PROCEDURE — C1894: CPT

## 2020-11-02 PROCEDURE — 84702 CHORIONIC GONADOTROPIN TEST: CPT

## 2020-11-02 PROCEDURE — 93010 ELECTROCARDIOGRAM REPORT: CPT | Mod: 59

## 2020-11-02 PROCEDURE — 80048 BASIC METABOLIC PNL TOTAL CA: CPT

## 2020-11-02 RX ORDER — CARVEDILOL PHOSPHATE 80 MG/1
1 CAPSULE, EXTENDED RELEASE ORAL
Qty: 0 | Refills: 0 | DISCHARGE

## 2020-11-02 RX ORDER — SACUBITRIL AND VALSARTAN 24; 26 MG/1; MG/1
1 TABLET, FILM COATED ORAL
Qty: 0 | Refills: 0 | DISCHARGE

## 2020-11-02 RX ORDER — ALBUTEROL 90 UG/1
2 AEROSOL, METERED ORAL
Qty: 0 | Refills: 0 | DISCHARGE

## 2020-11-02 RX ORDER — SPIRONOLACTONE 25 MG/1
1 TABLET, FILM COATED ORAL
Qty: 0 | Refills: 0 | DISCHARGE

## 2020-11-02 RX ORDER — LISINOPRIL 2.5 MG/1
1 TABLET ORAL
Qty: 0 | Refills: 0 | DISCHARGE

## 2020-11-02 NOTE — ASU DISCHARGE PLAN (ADULT/PEDIATRIC) - CALL YOUR DOCTOR IF YOU HAVE ANY OF THE FOLLOWING:
Bleeding that does not stop/Numbness, tingling, color or temperature change to extremity/Wound/Surgical Site with redness, or foul smelling discharge or pus/Pain not relieved by Medications/Fever greater than (need to indicate Fahrenheit or Celsius)/Swelling that gets worse

## 2020-11-02 NOTE — H&P CARDIOLOGY - FAMILY HISTORY
Mother  Still living? No  Family history of aneurysm, Age at diagnosis: Age Unknown     Father  Still living? No  Family history of CHF (congestive heart failure), Age at diagnosis: Age Unknown

## 2020-11-02 NOTE — H&P CARDIOLOGY - HISTORY OF PRESENT ILLNESS
Narrative: This is a 46 y/o female with no known implantable device no Covid 19 negative with  PMHx of HTN, CHF on Entresto ,Asthma, and family hx of CAD. Pt went to Cardiologist for routine visit  and had abnormal stress test and now referred for Cardiac Cath today with . Currently CP free no sob no palpitations no lightheadedness or dizziness noted.     Symptoms: none       Angina (Class):        Ischemic Symptoms:     Heart Failure: Systolic       Systolic/Diastolic/Combined:        NYHA Class (within 2 weeks):     Assessment of LVEF:< from: TTE with Doppler (w/Cont) (07.24.17 @ 17:25) >  CONCLUSIONS:  1. Tethered mitral valve leaflets. Mild mitral  regurgitation.  2. Normal trileaflet aortic valve. Mild aortic  regurgitation.  3. Severe left ventricular enlargement.  4. Endocardial visualization enhanced with intravenous  injectionof echo contrast (Definity). Severe global left  ventricular systolic dysfunction. No LV thrombus seen.  5. Normal right ventricular size and function.  *** No previous Echo exam.  ------------------------------------------------------------------------  Confirmed on  7/24/2017 - 18:29:05 by Tori Jay M.D. RPVI  ------------------------------------------------------------------------    < end of copied text >         EF:        Assessed by:        Date:     Prior Cardiac Interventions:< from: Cardiac Cath Lab - Adult (11.13.17 @ 09:37) >  VENTRICLES: EF estimated was 40 %.  VALVES: MITRAL VALVE: The mitral valve exhibited mild regurgitation.  CORONARY VESSELS: The coronary circulation is right dominant.  LM:   --  LM: Angiography showed minor luminal irregularities with no flow  limiting lesions.  LAD:   --  LAD: Normal.  CX:   --  Circumflex: Normal.  RCA:   --  RCA: Angiography showed minor luminal irregularities with no  flow limiting lesions.  COMPLICATIONS: There were no complications.  DIAGNOSTIC RECOMMENDATIONS: The patient should continue with the present  medications.  Prepared and signed by  Adal Salgado M.D.    < end of copied text >         PCI's:        CABG:     Noninvasive Testing:   Stress Test: Date:        Protocol:        Duration of Exercise:        Symptoms:        EKG Changes:        DTS:        Myocardial Imaging:        Risk Assessment:     Echo:     Antianginal Therapies:        Beta Blockers:         Calcium Channel Blockers:        Long Acting Nitrates:        Ranexa:     Associated Risk Factors:        Cerebrovascular Disease: N/A       Chronic Lung Disease: N/A       Peripheral Arterial Disease: N/A       Chronic Kidney Disease (if yes, what is GFR): N/A       Uncontrolled Diabetes (if yes, what is HgbA1C or FBS): N/A       Poorly Controlled Hypertension (if yes, what is SBP): N/A       Morbid Obesity (if yes, what is BMI): N/A       History of Recent Ventricular Arrhythmia: N/A       Inability to Ambulate Safely: N/A       Need for Therapeutic Anticoagulation: N/A       Antiplatelet or Contrast Allergy: N/A Narrative: This is a 46 y/o female with no known implantable device no Covid 19 negative with  PMHx of HTN, CHF on Entresto ,Asthma, and family hx of CAD. Pt went to Cardiologist due to increase sob dyspne on exertion and fatigue at times. Pt Cardiologist is   . Pt had abnormal stress test on 10/10/20 which revealed anterior defect septal LAD inferior septal wall with exercise induced ischemia.  Pt  now referred for [facility] Cardiac Cath today with . Currently CP free no sob no palpitations no lightheadedness or dizziness noted.     Symptoms: none       Angina (Class):        Ischemic Symptoms:     Heart Failure: Systolic       Systolic/Diastolic/Combined:        NYHA Class (within 2 weeks):     Assessment of LVEF:< from: TTE with Doppler (w/Cont) (07.24.17 @ 17:25) >  CONCLUSIONS:  1. Tethered mitral valve leaflets. Mild mitral  regurgitation.  2. Normal trileaflet aortic valve. Mild aortic  regurgitation.  3. Severe left ventricular enlargement.  4. Endocardial visualization enhanced with intravenous  injectionof echo contrast (Definity). Severe global left  ventricular systolic dysfunction. No LV thrombus seen.  5. Normal right ventricular size and function.  *** No previous Echo exam.  ------------------------------------------------------------------------  Confirmed on  7/24/2017 - 18:29:05 by CHANTE Irwin  ------------------------------------------------------------------------    < end of copied text >         EF:        Assessed by:        Date:     Prior Cardiac Interventions:< from: Cardiac Cath Lab - Adult (11.13.17 @ 09:37) >  VENTRICLES: EF estimated was 40 %.  VALVES: MITRAL VALVE: The mitral valve exhibited mild regurgitation.  CORONARY VESSELS: The coronary circulation is right dominant.  LM:   --  LM: Angiography showed minor luminal irregularities with no flow  limiting lesions.  LAD:   --  LAD: Normal.  CX:   --  Circumflex: Normal.  RCA:   --  RCA: Angiography showed minor luminal irregularities with no  flow limiting lesions.  COMPLICATIONS: There were no complications.  DIAGNOSTIC RECOMMENDATIONS: The patient should continue with the present  medications.  Prepared and signed by  Adal Salgado M.D.    < end of copied text >         PCI's:        CABG:     Noninvasive Testing:   Stress Test: Date:        Protocol:        Duration of Exercise:        Symptoms:        EKG Changes:        DTS:        Myocardial Imaging:        Risk Assessment:     Echo:     Antianginal Therapies:        Beta Blockers:         Calcium Channel Blockers:        Long Acting Nitrates:        Ranexa:     Associated Risk Factors:        Cerebrovascular Disease: N/A       Chronic Lung Disease: N/A       Peripheral Arterial Disease: N/A       Chronic Kidney Disease (if yes, what is GFR): N/A       Uncontrolled Diabetes (if yes, what is HgbA1C or FBS): N/A       Poorly Controlled Hypertension (if yes, what is SBP): N/A       Morbid Obesity (if yes, what is BMI): N/A       History of Recent Ventricular Arrhythmia: N/A       Inability to Ambulate Safely: N/A       Need for Therapeutic Anticoagulation: N/A       Antiplatelet or Contrast Allergy: N/A

## 2020-11-02 NOTE — ASU PATIENT PROFILE, ADULT - BLOOD AVOIDANCE/RESTRICTIONS, PROFILE
Patient discharged to home with ride, v/s stable, ambulatory, and dressing placed.  Discharge instructions reviewed.   none

## 2020-11-02 NOTE — ASU DISCHARGE PLAN (ADULT/PEDIATRIC) - CARE PROVIDER_API CALL
Amos Baker  CARDIOLOGY  [address] [facility], [place] [address]  Phone: [phone]  Fax: [phone]  Follow Up Time:

## 2020-11-02 NOTE — ASU DISCHARGE PLAN (ADULT/PEDIATRIC) - ASU DC SPECIAL INSTRUCTIONSFT
No heavy lifting for 2 weeks, no strenuous activity  or unnecessary stair climbing, no driving for x 2 days,  you may shower 24 hours following procedure but no bathing or swimming for x1  week, no bending, no straining while having a Bowel movement, No strenuous sexual activity for x 1 week check groin for bleeding, pain, tightness or ( golf ball size)  swelling daily following procedure , & follow up with your cardiologist in 1-2 week

## 2022-03-09 NOTE — DISCHARGE NOTE ADULT - PROVIDER TOKENS
Detail Level: Detailed
Detail Level: Zone
FREE:[LAST:[Ori],FIRST:[Reyes],PHONE:[phone]],FAX:[(   )    -]]
